# Patient Record
Sex: MALE | Race: WHITE | NOT HISPANIC OR LATINO | URBAN - METROPOLITAN AREA
[De-identification: names, ages, dates, MRNs, and addresses within clinical notes are randomized per-mention and may not be internally consistent; named-entity substitution may affect disease eponyms.]

---

## 2022-12-09 ENCOUNTER — EMERGENCY (EMERGENCY)
Facility: HOSPITAL | Age: 27
LOS: 1 days | Discharge: HOME | End: 2022-12-09
Attending: STUDENT IN AN ORGANIZED HEALTH CARE EDUCATION/TRAINING PROGRAM | Admitting: STUDENT IN AN ORGANIZED HEALTH CARE EDUCATION/TRAINING PROGRAM
Payer: SELF-PAY

## 2022-12-09 VITALS
RESPIRATION RATE: 18 BRPM | SYSTOLIC BLOOD PRESSURE: 113 MMHG | DIASTOLIC BLOOD PRESSURE: 82 MMHG | HEIGHT: 70 IN | HEART RATE: 89 BPM | WEIGHT: 145.06 LBS | OXYGEN SATURATION: 100 % | TEMPERATURE: 98 F

## 2022-12-09 DIAGNOSIS — J36 PERITONSILLAR ABSCESS: ICD-10-CM

## 2022-12-09 DIAGNOSIS — R09.81 NASAL CONGESTION: ICD-10-CM

## 2022-12-09 DIAGNOSIS — R07.0 PAIN IN THROAT: ICD-10-CM

## 2022-12-09 LAB
ALBUMIN SERPL ELPH-MCNC: 4.2 G/DL — SIGNIFICANT CHANGE UP (ref 3.3–5)
ALP SERPL-CCNC: 60 U/L — SIGNIFICANT CHANGE UP (ref 40–120)
ALT FLD-CCNC: 52 U/L — HIGH (ref 10–45)
ANION GAP SERPL CALC-SCNC: 9 MMOL/L — SIGNIFICANT CHANGE UP (ref 5–17)
AST SERPL-CCNC: 23 U/L — SIGNIFICANT CHANGE UP (ref 10–40)
BASOPHILS # BLD AUTO: 0.03 K/UL — SIGNIFICANT CHANGE UP (ref 0–0.2)
BASOPHILS NFR BLD AUTO: 0.3 % — SIGNIFICANT CHANGE UP (ref 0–2)
BILIRUB SERPL-MCNC: 0.4 MG/DL — SIGNIFICANT CHANGE UP (ref 0.2–1.2)
BUN SERPL-MCNC: 11 MG/DL — SIGNIFICANT CHANGE UP (ref 7–23)
CALCIUM SERPL-MCNC: 9.3 MG/DL — SIGNIFICANT CHANGE UP (ref 8.4–10.5)
CHLORIDE SERPL-SCNC: 101 MMOL/L — SIGNIFICANT CHANGE UP (ref 96–108)
CO2 SERPL-SCNC: 30 MMOL/L — SIGNIFICANT CHANGE UP (ref 22–31)
CREAT SERPL-MCNC: 0.84 MG/DL — SIGNIFICANT CHANGE UP (ref 0.5–1.3)
EGFR: 123 ML/MIN/1.73M2 — SIGNIFICANT CHANGE UP
EOSINOPHIL # BLD AUTO: 0.06 K/UL — SIGNIFICANT CHANGE UP (ref 0–0.5)
EOSINOPHIL NFR BLD AUTO: 0.7 % — SIGNIFICANT CHANGE UP (ref 0–6)
GLUCOSE SERPL-MCNC: 91 MG/DL — SIGNIFICANT CHANGE UP (ref 70–99)
HCT VFR BLD CALC: 39.2 % — SIGNIFICANT CHANGE UP (ref 39–50)
HETEROPH AB TITR SER AGGL: NEGATIVE — SIGNIFICANT CHANGE UP
HGB BLD-MCNC: 13.5 G/DL — SIGNIFICANT CHANGE UP (ref 13–17)
IMM GRANULOCYTES NFR BLD AUTO: 0.3 % — SIGNIFICANT CHANGE UP (ref 0–0.9)
LYMPHOCYTES # BLD AUTO: 2.14 K/UL — SIGNIFICANT CHANGE UP (ref 1–3.3)
LYMPHOCYTES # BLD AUTO: 23.9 % — SIGNIFICANT CHANGE UP (ref 13–44)
MCHC RBC-ENTMCNC: 31.7 PG — SIGNIFICANT CHANGE UP (ref 27–34)
MCHC RBC-ENTMCNC: 34.4 GM/DL — SIGNIFICANT CHANGE UP (ref 32–36)
MCV RBC AUTO: 92 FL — SIGNIFICANT CHANGE UP (ref 80–100)
MONOCYTES # BLD AUTO: 0.83 K/UL — SIGNIFICANT CHANGE UP (ref 0–0.9)
MONOCYTES NFR BLD AUTO: 9.3 % — SIGNIFICANT CHANGE UP (ref 2–14)
NEUTROPHILS # BLD AUTO: 5.87 K/UL — SIGNIFICANT CHANGE UP (ref 1.8–7.4)
NEUTROPHILS NFR BLD AUTO: 65.5 % — SIGNIFICANT CHANGE UP (ref 43–77)
NRBC # BLD: 0 /100 WBCS — SIGNIFICANT CHANGE UP (ref 0–0)
PLATELET # BLD AUTO: 288 K/UL — SIGNIFICANT CHANGE UP (ref 150–400)
POTASSIUM SERPL-MCNC: 3.5 MMOL/L — SIGNIFICANT CHANGE UP (ref 3.5–5.3)
POTASSIUM SERPL-SCNC: 3.5 MMOL/L — SIGNIFICANT CHANGE UP (ref 3.5–5.3)
PROT SERPL-MCNC: 7.1 G/DL — SIGNIFICANT CHANGE UP (ref 6–8.3)
RBC # BLD: 4.26 M/UL — SIGNIFICANT CHANGE UP (ref 4.2–5.8)
RBC # FLD: 11.5 % — SIGNIFICANT CHANGE UP (ref 10.3–14.5)
SODIUM SERPL-SCNC: 140 MMOL/L — SIGNIFICANT CHANGE UP (ref 135–145)
WBC # BLD: 8.96 K/UL — SIGNIFICANT CHANGE UP (ref 3.8–10.5)
WBC # FLD AUTO: 8.96 K/UL — SIGNIFICANT CHANGE UP (ref 3.8–10.5)

## 2022-12-09 PROCEDURE — 99285 EMERGENCY DEPT VISIT HI MDM: CPT

## 2022-12-09 PROCEDURE — 70491 CT SOFT TISSUE NECK W/DYE: CPT | Mod: 26,MG

## 2022-12-09 PROCEDURE — G1004: CPT

## 2022-12-09 RX ORDER — LIDOCAINE 4 G/100G
10 CREAM TOPICAL ONCE
Refills: 0 | Status: COMPLETED | OUTPATIENT
Start: 2022-12-09 | End: 2022-12-09

## 2022-12-09 RX ORDER — KETOROLAC TROMETHAMINE 30 MG/ML
15 SYRINGE (ML) INJECTION ONCE
Refills: 0 | Status: DISCONTINUED | OUTPATIENT
Start: 2022-12-09 | End: 2022-12-09

## 2022-12-09 RX ORDER — DEXAMETHASONE 0.5 MG/5ML
8 ELIXIR ORAL ONCE
Refills: 0 | Status: COMPLETED | OUTPATIENT
Start: 2022-12-09 | End: 2022-12-09

## 2022-12-09 RX ADMIN — Medication 15 MILLIGRAM(S): at 20:01

## 2022-12-09 RX ADMIN — Medication 100 MILLIGRAM(S): at 22:30

## 2022-12-09 RX ADMIN — Medication 8 MILLIGRAM(S): at 21:53

## 2022-12-09 RX ADMIN — LIDOCAINE 10 MILLILITER(S): 4 CREAM TOPICAL at 23:51

## 2022-12-09 RX ADMIN — Medication 15 MILLIGRAM(S): at 21:40

## 2022-12-09 NOTE — ED PROVIDER NOTE - OBJECTIVE STATEMENT
28 yo male in the ER with pain and swelling in his throat, concerned for possible abscess. Pt reports that a month ago he had similar symptoms and was seen at the urgent care. Pt states he had negative strep but because of his symptoms he was prescribed a course of abx that he had finished. Pt states 6 days ago he  noted right tonsil swelling and pain again. He went to the urgent care again. Tested negative for covid, flu and strep at this time again and prescribed Amoxicillin for his symptoms. pt is concerned that despite taking abx his throat pain became much worse. He has difficulty swallowing due to pain, reports entire right side jaw is painful, and swelling on the right side increased in size a lot. Pt reports subjective fever nd chills, some nasal congestion, hoarseness. 26 yo male in the ER with pain and swelling in his throat, concerned for possible abscess. Pt reports that a month ago he had similar symptoms and was seen at the urgent care. Pt states he had negative strep but because of his symptoms he was prescribed a course of abx that he had finished. Pt states 6 days ago he  noted right tonsil swelling and pain again. He went to the urgent care again. Tested negative for covid, flu and strep at this time again and prescribed Amoxicillin for his symptoms. pt is concerned that despite taking abx his throat pain became much worse. He has difficulty swallowing due to pain, reports entire right side jaw is painful, and swelling on the right side increased in size a lot. Pt reports subjective fever and chills, some nasal congestion, hoarseness.

## 2022-12-09 NOTE — ED PROVIDER NOTE - NSFOLLOWUPINSTRUCTIONS_ED_ALL_ED_FT
Please continue medications as prescribed and follow up with an ENT next week for re-evaluation,   Call [phone]      Peritonsillar Abscess    An open mouth showing the tonsils.   A peritonsillar abscess is a collection of pus in the back of the throat, behind the tonsils. It usually occurs when an infection of the throat or tonsils (tonsillitis) spreads into the tissues around the tonsils.      What are the causes?    The infection that leads to a peritonsillar abscess is usually caused by streptococcal bacteria.      What increases the risk?    You are more likely to develop this condition if:  •You have recently been diagnosed with an infection in your mouth or throat.      •You smoke.      •You have gum disease or gingivitis (periodontal disease).        What are the signs or symptoms?    Early symptoms of this condition include:  •Fever and chills.      •A sore throat, often with pain on just one side.      •Swollen, tender glands (lymph nodes) in the neck.      •Headache.      As the infection gets worse, symptoms may include:  •Difficulty swallowing.      •Drooling because of difficulty swallowing saliva.      •Difficulty opening your mouth.      •Bad breath.      •Changes in how the voice sounds.        How is this diagnosed?    This condition may be diagnosed based on:  •Your symptoms and medical history.      •A physical exam.      •Imaging tests, such as ultrasound or CT scan.      •Testing a pus sample from the abscess. Your health care provider may collect a pus sample by swabbing the back of your throat or by removing some pus with a syringe and needle (needle aspiration).        How is this treated?    Treatment usually involves draining the pus from the abscess. This may be done through needle aspiration or by making an incision in the abscess and draining the fluid. You will also likely need to take antibiotic medicine.      Follow these instructions at home:    Medicines     •Take over-the-counter and prescription medicines only as told by your health care provider.      •If you were prescribed an antibiotic, take it as told by your health care provider. Do not stop taking the antibiotic even if you start to feel better.        Eating and drinking   A diet of soft foods, including apple sauce, yogurt, and a smoothie.    •Drink enough fluid to keep your urine pale yellow.      •While your throat is sore, try only drinking liquids or eating only soft-textured foods such as yogurt and ice cream.      Activity     •Rest as told by your health care provider.      •Return to your normal activities as told by your health care provider. Ask your health care provider what activities are safe for you.      General Instructions   •If your abscess was drained, gargle with a mixture of salt and water 3–4 times a day or as needed.  •To make salt water, completely dissolve ½–1 tsp (3–6 g) of salt in 1 cup (237 mL) of warm water.      •Do not swallow this mixture.        • Do not use any products that contain nicotine or tobacco. These products include cigarettes, chewing tobacco, and vaping devices, such as e-cigarettes. If you need help quitting, ask your health care provider.      •Keep all follow-up visits. This is important.        Contact a health care provider if:    •You have more pain, swelling, redness, or pus in your throat.      •You have a headache.      •You have a lack of energy (lethargy) or feel generally sick.      •You have a fever or chills.      •You have trouble swallowing or eating.    •You have signs of dehydration, such as:  •Light-headedness or dizziness when standing.      •Urinating less than usual.      •A fast heart rate.      •Dry mouth.          Get help right away if:    •You are unable to swallow.      •You have trouble breathing, or it is easier for you to breathe when you lean forward.      •You cough up blood or vomit blood after treatment.      •You have severe throat pain that does not get better with medicine.      These symptoms may represent a serious problem that is an emergency. Do not wait to see if the symptoms will go away. Get medical help right away. Call your local emergency services (911 in the [place] Do not drive yourself to the hospital.       Summary    •A peritonsillar abscess is a collection of pus in the back of the throat. It usually occurs when an infection of the throat or tonsils spreads to surrounding tissues.      •Symptoms include a sore throat, difficulty swallowing, fever, chills, and occasional drooling.      •This condition is treated by draining the abscess and taking antibiotic medicine.      •Call your health care provider if you have trouble swallowing or eating after treatment.      •Get help right away if you vomit blood or cough up blood after treatment.      This information is not intended to replace advice given to you by your health care provider. Make sure you discuss any questions you have with your health care provider.

## 2022-12-09 NOTE — ED PROVIDER NOTE - ATTENDING APP SHARED VISIT CONTRIBUTION OF CARE
26 yo male in the Er with pain and swelling in his throat. Afebrile, non-toxic appearing. exam findings suspicious for possible PTA. plan :  -labs, CT soft tissue neck, IVF, Toradol and Decadron.     ER course.   Ct findings consistent with 2 cm right PTA. ENT called for consult and bedside I&D done by ENT. pt recommended to be d/c home with oral; Clindamycin and to f/u with ENT next week. D/c home stable.

## 2022-12-09 NOTE — ED ADULT TRIAGE NOTE - OTHER COMPLAINTS
patient c/o ongoing throat pain and swelling with difficulty swallowing, denies improvement after abx-- +swelling noted,  +voice change-- tolerating oral secretions

## 2022-12-09 NOTE — ED PROVIDER NOTE - PATIENT PORTAL LINK FT
You can access the FollowMyHealth Patient Portal offered by [facility] by registering at the following website: [email or web address]. By joining [employer]’s FollowMyHealth portal, you will also be able to view your health information using other applications (apps) compatible with our system.

## 2022-12-09 NOTE — ED PROVIDER NOTE - NS ED ATTENDING STATEMENT MOD
This was a shared visit with the PRACHI. I reviewed and verified the documentation and independently performed the documented:

## 2022-12-09 NOTE — ED ADULT NURSE REASSESSMENT NOTE - NS ED NURSE REASSESS COMMENT FT1
lidocaine out of stock in the ed notified pharmacist and clindamycin 900mg iv not stocked in ed notified pharmacist and sent tube to pharmacy at this time for medications to be sent to ed for administration.

## 2022-12-09 NOTE — ED ADULT NURSE NOTE - CAS DISCH BELONGINGS RETURNED
Implemented All Universal Safety Interventions:  [place] to call system. Call bell, personal items and telephone within reach. Instruct patient to call for assistance. Room bathroom lighting operational. Non-slip footwear when patient is off stretcher. Physically safe environment: no spills, clutter or unnecessary equipment. Stretcher in lowest position, wheels locked, appropriate side rails in place. Not applicable

## 2022-12-09 NOTE — ED ADULT TRIAGE NOTE - HEART RATE (BEATS/MIN)
Chief Complaint   Patient presents with     Labs Only     labs drawn via port by RN in lab     There were no vitals taken for this visit.    Port accessed by RN in lab. Labs collected and sent. Pt tolerated well. Line flushed with Normal Saline & Heparin.     Priscilla Gonzalez RN     89

## 2022-12-09 NOTE — CONSULT NOTE ADULT - SUBJECTIVE AND OBJECTIVE BOX
HPI: 26 yo male in the ER with pain and swelling in his throat, concerned for possible abscess. Pt reports that a month ago he had similar symptoms and was seen at the urgent care. Pt states he had negative strep but because of his symptoms he was prescribed a course of abx that he had finished. Pt states 6 days ago he  noted right tonsil swelling and pain again. He went to the urgent care again. Tested negative for covid, flu and strep at this time again and prescribed Amoxicillin for his symptoms. pt is concerned that despite taking abx his throat pain became much worse. He has difficulty swallowing due to pain, reports entire right side jaw is painful, and swelling on the right side increased in size a lot. Pt reports subjective fever nd chills, some nasal congestion, hoarseness.    Allergies    No Known Allergies    Intolerances        PAST MEDICAL & SURGICAL HISTORY:      MEDICATIONS:  Antiinfectives:     Hematologic/Anticoagulation:    Pain medications/Neuro:    IV fluids:    Endocrine Medications:     All other standing medications:   lidocaine 2% Viscous 10 milliLiter(s) Swish and Spit Once    All other PRN medications:      SOCIAL HISTORY:  Tobacco History:  ETOH Use:   Drug Use:     FAMILY HISTORY:      REVIEW OF SYSTEMS:     LABS:  CBC-                        13.5   8.96  )-----------( 288      ( 09 Dec 2022 20:01 )             39.2         12-09    140  |  101  |  11  ----------------------------<  91  3.5   |  30  |  0.84    Ca    9.3      09 Dec 2022 20:01    TPro  7.1  /  Alb  4.2  /  TBili  0.4  /  DBili  x   /  AST  23  /  ALT  52<H>  /  AlkPhos  60  12-09    Coagulation Studies-    Endocrine Panel-  --  --  9.3 mg/dL      Vital Signs Last 24 Hrs  T(C): 36.9 (09 Dec 2022 22:39), Max: 36.9 (09 Dec 2022 19:21)  T(F): 98.5 (09 Dec 2022 22:39), Max: 98.5 (09 Dec 2022 19:21)  HR: 58 (09 Dec 2022 22:39) (58 - 89)  BP: 107/66 (09 Dec 2022 22:39) (107/66 - 113/82)  BP(mean): --  RR: 16 (09 Dec 2022 22:39) (16 - 18)  SpO2: 99% (09 Dec 2022 22:39) (99% - 100%)    Parameters below as of 09 Dec 2022 22:39  Patient On (Oxygen Delivery Method): room air      PHYSICAL EXAM:  ENT EXAM-   Constitutional: Well-developed, well-nourished.  No hoarseness.     Head:  normocephalic, atraumatic.   Ears:  Ear canals both clear.  Tympanic membranes both intact; no effusion or retraction.  Nose:  Septum **intact / midline / deviated**.  Inferior turbinates normal bilateral  OC/OP:  Floor of mouth, buccal mucosa, lips, hard palate, soft palate, uvula, posterior pharyngeal wall normal.  Mucosa moist.  Neck:  Trachea midline.  Thyroid, parotid and submandibular glands normal.  Lymph:  No cervical adenopathy.  Facial Plastics:   MULTISYSTEM EXAM-  Neuro/Psych:  A&O x 3.  Mood stable.  Affect bright.  Cranial nerves: 2-12 grossly intact bilaterally.  Eyes:  EOMI, no nystagmus.  Pulm:  No dyspnea, non-labored breathing  Cardiovascular: Carotid pulses 2+ bilaterally.  No periphreal edema.  Skin:  No rash or lesions on exposed skin of head/neck    Procedure: PTA  Using a headlight for visualization, the patient was sprayed with hurricaine spray and then numbed with ***cc of 1% lidocaine w/epi. An 18 gauge needle was then used to aspirate purulent fluid from the *** peritonsillar region. Cultures were sent. Using an 11 blade scalpel, an curvilinear incision was then made in the area of aspiration from lateral to medial. The incision was spread open with a clamp and hemostasis was obtained.    RADIOLOGY & ADDITIONAL STUDIES:    < from: CT Neck Soft Tissue w/ IV Cont (12.09.22 @ 20:50) >  ACC: [id number] EXAM:  CT NECK SOFT TISSUE IC                          PROCEDURE DATE:  12/09/2022          INTERPRETATION:  Technique: A thin section axial acquisition was   performed from the skull base to the thoracic inlet.  The data was   reformatted in the sagittal, coronal and axial planes.    Contrast: 90 cc of Isovue-370 were administered intravenously.    Clinical Information: Right side back pain, dysphagia    Prior Studies: None    Findings:    *  Aerodigestive Tract: There is effacement of the aerodigestive tract at   the level of the palatine tonsils secondary to a 1.8 x 1.6 cm right   peritonsillar abscess. Patent upper airway. No radiopaque foreign body.   No retropharyngeal edema. No gas.    *  Lymph Nodes: No abnormal or enlarged lymph nodes.    *  Salivary Glands: Normal.    *  Thyroid Gland: Normal.    *  Orbits: Normal.    *  Brain: Limited visualized portions are within normal limits.    *  Skull Base: Intact.    *  Paranasal Sinuses: Clear.    *  Mastoids: Clear.    *  Cervical Spine: Within normal limits.    *  Lung Apices: Clear.      IMPRESSION:  2 cm right peritonsillar abscess.    --- End of Report ---          JOHN CORDOBA MD; Resident Radiologist  This document has been electronically signed.  DEBORAH SUE MD; Attending Radiologist  This document has been electronically signed. Dec  9 2022 10:30PM    < end of copied text >        A/P:  27y Male      Thank you for the consult, please page ENT at [phone] with any questions/concerns.  ----------------------------------------------------    Nessa Virgen MD  Department of Otolaryngology - Head and Neck Surgery  [facility]   HPI: 28 yo male in the ER with pain and swelling in his throat, concerned for possible abscess. Pt reports that a month ago he had similar symptoms and was seen at the urgent care. Pt states he had negative strep but because of his symptoms he was prescribed a course of abx that he had finished. Pt states 6 days ago he  noted right tonsil swelling and pain again. He went to the urgent care again. Tested negative for covid, flu and strep at this time again and prescribed Amoxicillin for his symptoms. pt is concerned that despite taking abx his throat pain became much worse. He has difficulty swallowing due to pain, reports entire right side jaw is painful, and swelling on the right side increased in size a lot. Pt reports subjective fever nd chills, some nasal congestion, hoarseness. CT in ED shows 2 cm R PTA.    Allergies    No Known Allergies    Intolerances        PAST MEDICAL & SURGICAL HISTORY:      MEDICATIONS:  Antiinfectives:     Hematologic/Anticoagulation:    Pain medications/Neuro:    IV fluids:    Endocrine Medications:     All other standing medications:   lidocaine 2% Viscous 10 milliLiter(s) Swish and Spit Once    All other PRN medications:      SOCIAL HISTORY:  Tobacco History:  ETOH Use:   Drug Use:     FAMILY HISTORY:      REVIEW OF SYSTEMS:     LABS:  CBC-                        13.5   8.96  )-----------( 288      ( 09 Dec 2022 20:01 )             39.2         12-09    140  |  101  |  11  ----------------------------<  91  3.5   |  30  |  0.84    Ca    9.3      09 Dec 2022 20:01    TPro  7.1  /  Alb  4.2  /  TBili  0.4  /  DBili  x   /  AST  23  /  ALT  52<H>  /  AlkPhos  60  12-09    Coagulation Studies-    Endocrine Panel-  --  --  9.3 mg/dL      Vital Signs Last 24 Hrs  T(C): 36.9 (09 Dec 2022 22:39), Max: 36.9 (09 Dec 2022 19:21)  T(F): 98.5 (09 Dec 2022 22:39), Max: 98.5 (09 Dec 2022 19:21)  HR: 58 (09 Dec 2022 22:39) (58 - 89)  BP: 107/66 (09 Dec 2022 22:39) (107/66 - 113/82)  BP(mean): --  RR: 16 (09 Dec 2022 22:39) (16 - 18)  SpO2: 99% (09 Dec 2022 22:39) (99% - 100%)    Parameters below as of 09 Dec 2022 22:39  Patient On (Oxygen Delivery Method): room air      PHYSICAL EXAM:  ENT EXAM-   Constitutional: Well-developed, well-nourished.  No hoarseness.     Head:  normocephalic, atraumatic.   OC/OP:  Floor of mouth, buccal mucosa, lips normal. B/l tonsillar enlargement with R peritonsillar fullness and uvular deviation  Neck:  Trachea midline.  Thyroid, parotid and submandibular glands normal.  MULTISYSTEM EXAM-  Neuro/Psych:  A&O x 3.  Mood stable.  Affect bright.  Eyes:  EOMI, no nystagmus.  Skin:  No rash or lesions on exposed skin of head/neck    Procedure: PTA  Using a headlight for visualization, the patient was numbed with viscous lidocaine and 2cc of 2% lidocaine . An 18 gauge needle was then used to aspirate purulent fluid from the R peritonsillar region. Cultures were sent. Using an 11 blade scalpel, an curvilinear incision was then made in the area of aspiration from lateral to medial. The incision was spread open with a clamp and hemostasis was obtained.    RADIOLOGY & ADDITIONAL STUDIES:    < from: CT Neck Soft Tissue w/ IV Cont (12.09.22 @ 20:50) >  ACC: [id number] EXAM:  CT NECK SOFT TISSUE IC                          PROCEDURE DATE:  12/09/2022          INTERPRETATION:  Technique: A thin section axial acquisition was   performed from the skull base to the thoracic inlet.  The data was   reformatted in the sagittal, coronal and axial planes.    Contrast: 90 cc of Isovue-370 were administered intravenously.    Clinical Information: Right side back pain, dysphagia    Prior Studies: None    Findings:    *  Aerodigestive Tract: There is effacement of the aerodigestive tract at   the level of the palatine tonsils secondary to a 1.8 x 1.6 cm right   peritonsillar abscess. Patent upper airway. No radiopaque foreign body.   No retropharyngeal edema. No gas.    *  Lymph Nodes: No abnormal or enlarged lymph nodes.    *  Salivary Glands: Normal.    *  Thyroid Gland: Normal.    *  Orbits: Normal.    *  Brain: Limited visualized portions are within normal limits.    *  Skull Base: Intact.    *  Paranasal Sinuses: Clear.    *  Mastoids: Clear.    *  Cervical Spine: Within normal limits.    *  Lung Apices: Clear.      IMPRESSION:  2 cm right peritonsillar abscess.    --- End of Report ---          JOHN CORDOBA MD; Resident Radiologist  This document has been electronically signed.  DEBORAH SUE MD; Attending Radiologist  This document has been electronically signed. Dec  9 2022 10:30PM    < end of copied text >        A/P:  27y Male s/p I&D of R PTA  -Clindamycin x 10 days  -f/u with ENT [phone]  -Counseled to return to ED if worsening pain/symptoms or no improvement in next 24-48 hours  -d/w attending  -Call with questions        Thank you for the consult, please page ENT at [phone] with any questions/concerns.  ----------------------------------------------------    Nessa Virgen MD  Department of Otolaryngology - Head and Neck Surgery  [facility]

## 2022-12-09 NOTE — ED PROVIDER NOTE - CLINICAL SUMMARY MEDICAL DECISION MAKING FREE TEXT BOX
28 yo male in the Er with pain and swelling in his throat. Afebrile, non-toxic appearing. exam findings suspicious for possible PTA. plan :  -labs, CT soft tissue neck, IVF, Toradol and Decadron.     ER course.   Ct findings consistent with 2 cm right PTA. ENT called for consult and bedside I&D done by ENT. pt recommended to be d/c home with oral; Clindamycin and to f/u with ENT next week. D/c home stable.

## 2022-12-09 NOTE — ED PROVIDER NOTE - ENMT, MLM
Airway patent, Nasal mucosa clear. posterior pharyngeal erythema+, enlarged tonsils, R>L,  Throat has no vesicles, no oropharyngeal exudates and uvula is midline.

## 2022-12-10 VITALS
RESPIRATION RATE: 16 BRPM | OXYGEN SATURATION: 98 % | TEMPERATURE: 98 F | HEART RATE: 64 BPM | DIASTOLIC BLOOD PRESSURE: 72 MMHG | SYSTOLIC BLOOD PRESSURE: 110 MMHG

## 2022-12-10 LAB
GRAM STN FLD: SIGNIFICANT CHANGE UP
SPECIMEN SOURCE: SIGNIFICANT CHANGE UP

## 2022-12-10 PROCEDURE — G1004: CPT

## 2022-12-10 PROCEDURE — 80053 COMPREHEN METABOLIC PANEL: CPT

## 2022-12-10 PROCEDURE — 36415 COLL VENOUS BLD VENIPUNCTURE: CPT

## 2022-12-10 PROCEDURE — 87070 CULTURE OTHR SPECIMN AEROBIC: CPT

## 2022-12-10 PROCEDURE — 99284 EMERGENCY DEPT VISIT MOD MDM: CPT | Mod: 25

## 2022-12-10 PROCEDURE — 96375 TX/PRO/DX INJ NEW DRUG ADDON: CPT | Mod: XU

## 2022-12-10 PROCEDURE — 70491 CT SOFT TISSUE NECK W/DYE: CPT | Mod: MG

## 2022-12-10 PROCEDURE — 42700 I&D ABSCESS PERITONSILLAR: CPT

## 2022-12-10 PROCEDURE — 96374 THER/PROPH/DIAG INJ IV PUSH: CPT | Mod: XU

## 2022-12-10 PROCEDURE — 85025 COMPLETE CBC W/AUTO DIFF WBC: CPT

## 2022-12-10 PROCEDURE — 87205 SMEAR GRAM STAIN: CPT

## 2022-12-10 PROCEDURE — 86308 HETEROPHILE ANTIBODY SCREEN: CPT

## 2022-12-10 PROCEDURE — 87075 CULTR BACTERIA EXCEPT BLOOD: CPT

## 2022-12-12 LAB
CULTURE RESULTS: SIGNIFICANT CHANGE UP
SPECIMEN SOURCE: SIGNIFICANT CHANGE UP

## 2023-02-21 ENCOUNTER — EMERGENCY (EMERGENCY)
Facility: HOSPITAL | Age: 28
LOS: 1 days | Discharge: HOME | End: 2023-02-21
Attending: STUDENT IN AN ORGANIZED HEALTH CARE EDUCATION/TRAINING PROGRAM | Admitting: STUDENT IN AN ORGANIZED HEALTH CARE EDUCATION/TRAINING PROGRAM
Payer: SELF-PAY

## 2023-02-21 VITALS
TEMPERATURE: 98 F | RESPIRATION RATE: 18 BRPM | OXYGEN SATURATION: 98 % | DIASTOLIC BLOOD PRESSURE: 72 MMHG | SYSTOLIC BLOOD PRESSURE: 107 MMHG

## 2023-02-21 VITALS
HEART RATE: 71 BPM | SYSTOLIC BLOOD PRESSURE: 133 MMHG | DIASTOLIC BLOOD PRESSURE: 84 MMHG | OXYGEN SATURATION: 97 % | TEMPERATURE: 99 F | RESPIRATION RATE: 18 BRPM

## 2023-02-21 DIAGNOSIS — F17.200 NICOTINE DEPENDENCE, UNSPECIFIED, UNCOMPLICATED: ICD-10-CM

## 2023-02-21 DIAGNOSIS — R07.0 PAIN IN THROAT: ICD-10-CM

## 2023-02-21 LAB
ANION GAP SERPL CALC-SCNC: 8 MMOL/L — SIGNIFICANT CHANGE UP (ref 5–17)
BASOPHILS # BLD AUTO: 0.02 K/UL — SIGNIFICANT CHANGE UP (ref 0–0.2)
BASOPHILS NFR BLD AUTO: 0.2 % — SIGNIFICANT CHANGE UP (ref 0–2)
BUN SERPL-MCNC: 13 MG/DL — SIGNIFICANT CHANGE UP (ref 7–23)
CALCIUM SERPL-MCNC: 9.2 MG/DL — SIGNIFICANT CHANGE UP (ref 8.4–10.5)
CHLORIDE SERPL-SCNC: 103 MMOL/L — SIGNIFICANT CHANGE UP (ref 96–108)
CO2 SERPL-SCNC: 30 MMOL/L — SIGNIFICANT CHANGE UP (ref 22–31)
CREAT SERPL-MCNC: 0.8 MG/DL — SIGNIFICANT CHANGE UP (ref 0.5–1.3)
EGFR: 125 ML/MIN/1.73M2 — SIGNIFICANT CHANGE UP
EOSINOPHIL # BLD AUTO: 0.08 K/UL — SIGNIFICANT CHANGE UP (ref 0–0.5)
EOSINOPHIL NFR BLD AUTO: 0.9 % — SIGNIFICANT CHANGE UP (ref 0–6)
GLUCOSE SERPL-MCNC: 110 MG/DL — HIGH (ref 70–99)
HCT VFR BLD CALC: 41.7 % — SIGNIFICANT CHANGE UP (ref 39–50)
HGB BLD-MCNC: 14.6 G/DL — SIGNIFICANT CHANGE UP (ref 13–17)
IMM GRANULOCYTES NFR BLD AUTO: 0.3 % — SIGNIFICANT CHANGE UP (ref 0–0.9)
LYMPHOCYTES # BLD AUTO: 1.5 K/UL — SIGNIFICANT CHANGE UP (ref 1–3.3)
LYMPHOCYTES # BLD AUTO: 16.4 % — SIGNIFICANT CHANGE UP (ref 13–44)
MCHC RBC-ENTMCNC: 31.8 PG — SIGNIFICANT CHANGE UP (ref 27–34)
MCHC RBC-ENTMCNC: 35 GM/DL — SIGNIFICANT CHANGE UP (ref 32–36)
MCV RBC AUTO: 90.8 FL — SIGNIFICANT CHANGE UP (ref 80–100)
MONOCYTES # BLD AUTO: 0.63 K/UL — SIGNIFICANT CHANGE UP (ref 0–0.9)
MONOCYTES NFR BLD AUTO: 6.9 % — SIGNIFICANT CHANGE UP (ref 2–14)
NEUTROPHILS # BLD AUTO: 6.86 K/UL — SIGNIFICANT CHANGE UP (ref 1.8–7.4)
NEUTROPHILS NFR BLD AUTO: 75.3 % — SIGNIFICANT CHANGE UP (ref 43–77)
NRBC # BLD: 0 /100 WBCS — SIGNIFICANT CHANGE UP (ref 0–0)
PLATELET # BLD AUTO: 282 K/UL — SIGNIFICANT CHANGE UP (ref 150–400)
POTASSIUM SERPL-MCNC: 3.7 MMOL/L — SIGNIFICANT CHANGE UP (ref 3.5–5.3)
POTASSIUM SERPL-SCNC: 3.7 MMOL/L — SIGNIFICANT CHANGE UP (ref 3.5–5.3)
RBC # BLD: 4.59 M/UL — SIGNIFICANT CHANGE UP (ref 4.2–5.8)
RBC # FLD: 11.8 % — SIGNIFICANT CHANGE UP (ref 10.3–14.5)
S PYO AG SPEC QL IA: NEGATIVE — SIGNIFICANT CHANGE UP
SODIUM SERPL-SCNC: 141 MMOL/L — SIGNIFICANT CHANGE UP (ref 135–145)
WBC # BLD: 9.12 K/UL — SIGNIFICANT CHANGE UP (ref 3.8–10.5)
WBC # FLD AUTO: 9.12 K/UL — SIGNIFICANT CHANGE UP (ref 3.8–10.5)

## 2023-02-21 PROCEDURE — 99285 EMERGENCY DEPT VISIT HI MDM: CPT

## 2023-02-21 PROCEDURE — 87880 STREP A ASSAY W/OPTIC: CPT

## 2023-02-21 PROCEDURE — 96361 HYDRATE IV INFUSION ADD-ON: CPT

## 2023-02-21 PROCEDURE — 87081 CULTURE SCREEN ONLY: CPT

## 2023-02-21 PROCEDURE — 85025 COMPLETE CBC W/AUTO DIFF WBC: CPT

## 2023-02-21 PROCEDURE — 36415 COLL VENOUS BLD VENIPUNCTURE: CPT

## 2023-02-21 PROCEDURE — 80048 BASIC METABOLIC PNL TOTAL CA: CPT

## 2023-02-21 PROCEDURE — 96375 TX/PRO/DX INJ NEW DRUG ADDON: CPT

## 2023-02-21 PROCEDURE — 99284 EMERGENCY DEPT VISIT MOD MDM: CPT | Mod: 25

## 2023-02-21 PROCEDURE — 96374 THER/PROPH/DIAG INJ IV PUSH: CPT

## 2023-02-21 RX ORDER — SODIUM CHLORIDE 9 MG/ML
1000 INJECTION, SOLUTION INTRAVENOUS ONCE
Refills: 0 | Status: COMPLETED | OUTPATIENT
Start: 2023-02-21 | End: 2023-02-21

## 2023-02-21 RX ORDER — DEXAMETHASONE 0.5 MG/5ML
10 ELIXIR ORAL ONCE
Refills: 0 | Status: DISCONTINUED | OUTPATIENT
Start: 2023-02-21 | End: 2023-02-21

## 2023-02-21 RX ORDER — AMPICILLIN SODIUM AND SULBACTAM SODIUM 250; 125 MG/ML; MG/ML
3 INJECTION, POWDER, FOR SUSPENSION INTRAMUSCULAR; INTRAVENOUS ONCE
Refills: 0 | Status: COMPLETED | OUTPATIENT
Start: 2023-02-21 | End: 2023-02-21

## 2023-02-21 RX ORDER — BENZOCAINE 10 %
1 GEL (GRAM) MUCOUS MEMBRANE ONCE
Refills: 0 | Status: COMPLETED | OUTPATIENT
Start: 2023-02-21 | End: 2023-02-21

## 2023-02-21 RX ORDER — LIDOCAINE HYDROCHLORIDE AND EPINEPHRINE 10; 10 MG/ML; UG/ML
5 INJECTION, SOLUTION INFILTRATION; PERINEURAL ONCE
Refills: 0 | Status: COMPLETED | OUTPATIENT
Start: 2023-02-21 | End: 2023-02-21

## 2023-02-21 RX ORDER — KETOROLAC TROMETHAMINE 30 MG/ML
15 SYRINGE (ML) INJECTION ONCE
Refills: 0 | Status: DISCONTINUED | OUTPATIENT
Start: 2023-02-21 | End: 2023-02-21

## 2023-02-21 RX ADMIN — SODIUM CHLORIDE 1000 MILLILITER(S): 9 INJECTION, SOLUTION INTRAVENOUS at 21:37

## 2023-02-21 RX ADMIN — Medication 1 SPRAY(S): at 22:41

## 2023-02-21 RX ADMIN — Medication 15 MILLIGRAM(S): at 22:42

## 2023-02-21 RX ADMIN — SODIUM CHLORIDE 1000 MILLILITER(S): 9 INJECTION, SOLUTION INTRAVENOUS at 22:42

## 2023-02-21 RX ADMIN — Medication 15 MILLIGRAM(S): at 21:36

## 2023-02-21 RX ADMIN — AMPICILLIN SODIUM AND SULBACTAM SODIUM 200 GRAM(S): 250; 125 INJECTION, POWDER, FOR SUSPENSION INTRAMUSCULAR; INTRAVENOUS at 22:46

## 2023-02-21 NOTE — ED PROVIDER NOTE - PATIENT PORTAL LINK FT
You can access the FollowMyHealth Patient Portal offered by [facility] by registering at the following website: [email or web address]. By joining [employer]’s FollowMyHealth portal, you will also be able to view your health information using other applications (apps) compatible with our system.

## 2023-02-21 NOTE — ED PROVIDER NOTE - NSFOLLOWUPINSTRUCTIONS_ED_ALL_ED_FT
Take clindamycin as prescribed for 10 days. Follow up with ENT for further management of your recurrent throat infections.    Come back to ED if you develop high fevers, drooling, trouble opening your mouth, difficulty swallowing, change in voice.    Please reach out to Hilda Mora ([facility] ED clinical referral coordinator) to assist you with your follow-up appointment.     Monday - Friday 11am-7pm  [phone]  [email or web address]

## 2023-02-21 NOTE — ED ADULT NURSE NOTE - PAIN RATING/NUMBER SCALE (0-10): ACTIVITY
change of breathing pattern/cough/gurgly voice/fever/pneumonia/throat clearing/upper respiratory infection 3 (mild pain)

## 2023-02-21 NOTE — ED ADULT TRIAGE NOTE - CCCP TRG CHIEF CMPLNT
PATIENT LAST HAD SLEEP STUDY ABOUT 15 YEARS AGO. I HAVE NOT BEEN ABLE TO LOCATE THE REPORT FOR THE SLEEP STUDY, NOR HAS THE PATIENT, SO ADVISED PATIENT THAT HE WILL NEED A NEW SLEEP STUDY DONE. HE WAS AGREEABLE TO THIS. ORDER HAS BEEN FAXED TO [facility] FOR SLEEP STUDY AND THEY WILL CONTACT PATIENT TO SET UP.       ----- Message from Aleksandra Richard MA sent at 5/14/2018 12:27 PM EDT -----  Contact: JÚNIOR WITH [facility] CALLED FOR SLEEP STUDY TO BE FAXED     FAX NUMBER FOR [facility] [phone]       [phone]      throat, pain

## 2023-02-21 NOTE — ED PROVIDER NOTE - OBJECTIVE STATEMENT
27yM w/ hx R PTA 2 months ago (prior to that started amox which he found unhelpful, developed PTA and was drained by ENT and took clinda)  comes in with pain to the R side of his throat x 2 days. Says it feels the same to his prior PTA, so started taking the amox he had left over (took 2 doses so far). No trismus, drooling, fever, voice changes. Is concerned his PTA is back.  Pt also says he is "a chain smoker" but is quitting bc he feels the smoking is contributing to his throat and tonsil infections.

## 2023-02-21 NOTE — ED ADULT NURSE NOTE - OBJECTIVE STATEMENT
Pt is 27 y.o male client walked in complaining of R PTA and sore throat started days ago. Pt was also seen in [facility] ED 2 months ago for the same symptoms diagnosed with right PTA dced with abx now complaining of similar sx. Pt admits pain only with swallowing. Denies fever, chills, headache, ear pain, hoarseness of voice not noted. Pt noted to have R swollen tonsil.  Pt took amoxicillin was prescribed the first time he had PTA at urgent care with no relief. Assessment ongoing. Will cont to monitor.

## 2023-02-21 NOTE — ED PROVIDER NOTE - PHYSICAL EXAMINATION
CONST: nontoxic NAD speaking in full sentences  HEAD: atraumatic  EYES: conjunctivae clear, PERRL, EOMI  ENT: mmm, uvula midline but R tonsillar area prominent and erythematous, appears full  NECK: supple/FROM, nttp  CARD: rrr   CHEST: ctab no r/r/w, no stridor/retractions/tripoding  ABD: soft, nd, nttp, no rebound/guarding  EXT: FROM, symmetric distal pulses intact  SKIN: warm, dry, no rash, no pedal edema/ttp/rash, cap refill <2sec  NEURO: awake alert answering questions following commands moving all extremities

## 2024-03-24 ENCOUNTER — NON-APPOINTMENT (OUTPATIENT)
Age: 29
End: 2024-03-24

## 2024-03-26 ENCOUNTER — NON-APPOINTMENT (OUTPATIENT)
Age: 29
End: 2024-03-26

## 2024-04-20 ENCOUNTER — TRANSCRIPTION ENCOUNTER (OUTPATIENT)
Age: 29
End: 2024-04-20

## 2024-04-20 ENCOUNTER — APPOINTMENT (OUTPATIENT)
Dept: OTOLARYNGOLOGY | Facility: CLINIC | Age: 29
End: 2024-04-20
Payer: COMMERCIAL

## 2024-04-20 VITALS
WEIGHT: 160 LBS | BODY MASS INDEX: 22.9 KG/M2 | HEIGHT: 70 IN | OXYGEN SATURATION: 97 % | TEMPERATURE: 97.7 F | DIASTOLIC BLOOD PRESSURE: 79 MMHG | SYSTOLIC BLOOD PRESSURE: 131 MMHG | HEART RATE: 85 BPM

## 2024-04-20 DIAGNOSIS — Z78.9 OTHER SPECIFIED HEALTH STATUS: ICD-10-CM

## 2024-04-20 DIAGNOSIS — F17.200 NICOTINE DEPENDENCE, UNSPECIFIED, UNCOMPLICATED: ICD-10-CM

## 2024-04-20 PROBLEM — Z00.00 ENCOUNTER FOR PREVENTIVE HEALTH EXAMINATION: Status: ACTIVE | Noted: 2024-04-20

## 2024-04-20 PROCEDURE — 31575 DIAGNOSTIC LARYNGOSCOPY: CPT

## 2024-04-20 PROCEDURE — 99204 OFFICE O/P NEW MOD 45 MIN: CPT | Mod: 25

## 2024-04-20 NOTE — CONSULT LETTER
[Dear  ___] : Dear  [unfilled], [Consult Letter:] : I had the pleasure of evaluating your patient, [unfilled]. [Please see my note below.] : Please see my note below. [Consult Closing:] : Thank you very much for allowing me to participate in the care of this patient.  If you have any questions, please do not hesitate to contact me. [FreeTextEntry3] : MAYA Marie Jr, MD, FAAOHNS Otolaryngologist [facility]

## 2024-04-20 NOTE — PROCEDURE
[de-identified] : Indication: requirement for exam not possible via anterior examination; recurrent pharyngitis After verbal consent and the administration of an aerosolized oxymetazoline/lidocaine mix, examination was performed with a flexible endoscope placed through the nose which was attached to a video monitoring system. Findings: Nasopharynx: unremarkable Soft palate, lateral and posterior pharyngeal walls: unremarkable Base of tongue & lingual tonsil: minimal retrodisplacement Vallecula: unremarkable Epiglottis: unremarkable Piriform sinuses and pharyngoesophageal junction: unremarkable Arytenoids and AE folds: mild interarytenoid edema Ventricle and false vocal folds: unremarkable True vocal folds: Smooth free edge; surface without ectasias or edema; normal movement bilaterally with good apposition in phonation Visible subglottis: unremarkable Narrow-band imaging: not used Other findings: none

## 2024-04-20 NOTE — PHYSICAL EXAM
[Laryngoscopy Performed] : laryngoscopy was performed, see procedure section for findings [de-identified] : 2+ [Normal] : no masses and lesions seen, face is symmetric

## 2024-04-20 NOTE — ASSESSMENT
[FreeTextEntry1] : We discussed the R&B of tonsillectomy and scheduled this; RTC preop  Reviewed reflux precautions and provided the patient with the corresponding educational handout.

## 2024-04-20 NOTE — HISTORY OF PRESENT ILLNESS
[de-identified] : Four to five episodes of tonsillitis per year and he's had two PTAs in the last year. He wants them removed.  He also has frequent tonsilliths.  Infrequent reflux

## 2024-05-02 ENCOUNTER — APPOINTMENT (OUTPATIENT)
Dept: INTERNAL MEDICINE | Facility: CLINIC | Age: 29
End: 2024-05-02
Payer: COMMERCIAL

## 2024-05-02 ENCOUNTER — NON-APPOINTMENT (OUTPATIENT)
Age: 29
End: 2024-05-02

## 2024-05-02 ENCOUNTER — APPOINTMENT (OUTPATIENT)
Dept: OTOLARYNGOLOGY | Facility: CLINIC | Age: 29
End: 2024-05-02
Payer: COMMERCIAL

## 2024-05-02 VITALS
HEIGHT: 70 IN | OXYGEN SATURATION: 97 % | DIASTOLIC BLOOD PRESSURE: 77 MMHG | HEART RATE: 81 BPM | BODY MASS INDEX: 22.9 KG/M2 | TEMPERATURE: 97.7 F | SYSTOLIC BLOOD PRESSURE: 130 MMHG | WEIGHT: 160 LBS | RESPIRATION RATE: 16 BRPM

## 2024-05-02 VITALS
OXYGEN SATURATION: 97 % | HEIGHT: 70 IN | DIASTOLIC BLOOD PRESSURE: 79 MMHG | SYSTOLIC BLOOD PRESSURE: 119 MMHG | TEMPERATURE: 97 F | WEIGHT: 160 LBS | BODY MASS INDEX: 22.9 KG/M2 | HEART RATE: 81 BPM

## 2024-05-02 DIAGNOSIS — Z01.818 ENCOUNTER FOR OTHER PREPROCEDURAL EXAMINATION: ICD-10-CM

## 2024-05-02 PROCEDURE — 99214 OFFICE O/P EST MOD 30 MIN: CPT | Mod: 25

## 2024-05-02 PROCEDURE — G0403: CPT

## 2024-05-02 PROCEDURE — 99215 OFFICE O/P EST HI 40 MIN: CPT

## 2024-05-02 PROCEDURE — 36415 COLL VENOUS BLD VENIPUNCTURE: CPT

## 2024-05-02 RX ORDER — OXYCODONE AND ACETAMINOPHEN 5; 325 MG/1; MG/1
5-325 TABLET ORAL
Qty: 30 | Refills: 0 | Status: DISCONTINUED | COMMUNITY
Start: 2024-05-02 | End: 2024-05-02

## 2024-05-02 RX ORDER — AMOXICILLIN 400 MG/5ML
400 FOR SUSPENSION ORAL
Qty: 2 | Refills: 0 | Status: DISCONTINUED | COMMUNITY
Start: 2024-05-02 | End: 2024-05-02

## 2024-05-02 RX ORDER — AMOXICILLIN 400 MG/5ML
400 FOR SUSPENSION ORAL
Qty: 2 | Refills: 0 | Status: ACTIVE | COMMUNITY
Start: 2024-05-02 | End: 1900-01-01

## 2024-05-02 RX ORDER — OXYCODONE AND ACETAMINOPHEN 5; 325 MG/1; MG/1
5-325 TABLET ORAL
Qty: 30 | Refills: 0 | Status: ACTIVE | COMMUNITY
Start: 2024-05-02 | End: 1900-01-01

## 2024-05-02 NOTE — ASSESSMENT
[FreeTextEntry1] : Preop done for tonsillectomy. The risks and benefits were fully reviewed including but not limited to: postoperative hemorrhage; velopharyngeal insufficiency; swallowing or speaking problems; asymmetric appearing palate. The patient would like to proceed. An educational perioperative care handout was given.  n/a

## 2024-05-02 NOTE — HISTORY OF PRESENT ILLNESS
[de-identified] : Four to five episodes of tonsillitis per year and he's had two PTAs in the last year. Now preop to have them removed.  He also has frequent tonsilliths.  Infrequent reflux

## 2024-05-03 LAB
ALBUMIN SERPL ELPH-MCNC: 4.7 G/DL
ALP BLD-CCNC: 81 U/L
ALT SERPL-CCNC: 69 U/L
ANION GAP SERPL CALC-SCNC: 13 MMOL/L
APTT BLD: 28.7 SEC
AST SERPL-CCNC: 44 U/L
BILIRUB SERPL-MCNC: 0.4 MG/DL
BUN SERPL-MCNC: 15 MG/DL
CALCIUM SERPL-MCNC: 9.6 MG/DL
CHLORIDE SERPL-SCNC: 106 MMOL/L
CO2 SERPL-SCNC: 24 MMOL/L
CREAT SERPL-MCNC: 1 MG/DL
EGFR: 106 ML/MIN/1.73M2
GLUCOSE SERPL-MCNC: 106 MG/DL
HCT VFR BLD CALC: 45.8 %
HGB BLD-MCNC: 15.1 G/DL
INR PPP: 0.96 RATIO
MCHC RBC-ENTMCNC: 32.3 PG
MCHC RBC-ENTMCNC: 33 GM/DL
MCV RBC AUTO: 97.9 FL
PLATELET # BLD AUTO: 264 K/UL
POTASSIUM SERPL-SCNC: 4.6 MMOL/L
PROT SERPL-MCNC: 7.2 G/DL
PT BLD: 10.9 SEC
RBC # BLD: 4.68 M/UL
RBC # FLD: 13.3 %
SODIUM SERPL-SCNC: 142 MMOL/L
WBC # FLD AUTO: 5.53 K/UL

## 2024-05-03 NOTE — HISTORY OF PRESENT ILLNESS
[(Patient denies any chest pain, claudication, dyspnea on exertion, orthopnea, palpitations or syncope)] : Patient denies any chest pain, claudication, dyspnea on exertion, orthopnea, palpitations or syncope [Good (7-10 METs)] : Good (7-10 METs) [No Pertinent Cardiac History] : no history of aortic stenosis, atrial fibrillation, coronary artery disease, recent myocardial infarction, or implantable device/pacemaker [No Pertinent Pulmonary History] : no history of asthma, COPD, sleep apnea, or smoking [No Adverse Anesthesia Reaction] : no adverse anesthesia reaction in self or family member [FreeTextEntry1] : tonsillectomy  [FreeTextEntry2] : 05/08/2024 [FreeTextEntry4] : 28 yrs old M with pmx of seasonal allergies, MSM comes in for pre-op eval for tonsillectomy.  No new complains.  Denies any chest pain, cough, fevers, abd pain, vomiting, diarrhea, rash, joint pains, sob, palpitations. Good functional capacity: can walk 3 flights of stairs without any problems.  No problems with anesthesia in the past. Denies any blood thinners or herbal meds use.

## 2024-05-03 NOTE — ASSESSMENT
[Patient Optimized for Surgery] : Patient optimized for surgery [No Further Testing Recommended] : no further testing recommended [FreeTextEntry4] : 28 yrs old M with pmx of seasonal allergies, MSM comes in for pre-op eval for tonsillectomy.  RCRI 0 points class I risk. Good functional status Pt is low risk for low risk procedure. No interventions needed from medicine at this point of time.

## 2024-05-08 ENCOUNTER — RESULT REVIEW (OUTPATIENT)
Age: 29
End: 2024-05-08

## 2024-05-08 ENCOUNTER — APPOINTMENT (OUTPATIENT)
Age: 29
End: 2024-05-08
Payer: COMMERCIAL

## 2024-05-08 PROCEDURE — 42826 REMOVAL OF TONSILS: CPT

## 2024-07-02 ENCOUNTER — APPOINTMENT (OUTPATIENT)
Dept: OTOLARYNGOLOGY | Facility: CLINIC | Age: 29
End: 2024-07-02

## 2024-07-02 VITALS
HEART RATE: 101 BPM | HEIGHT: 70 IN | DIASTOLIC BLOOD PRESSURE: 78 MMHG | OXYGEN SATURATION: 98 % | SYSTOLIC BLOOD PRESSURE: 113 MMHG | BODY MASS INDEX: 22.62 KG/M2 | TEMPERATURE: 99.4 F | WEIGHT: 158 LBS

## 2024-07-02 DIAGNOSIS — J36 PERITONSILLAR ABSCESS: ICD-10-CM

## 2024-07-02 DIAGNOSIS — J06.9 ACUTE UPPER RESPIRATORY INFECTION, UNSPECIFIED: ICD-10-CM

## 2024-07-02 DIAGNOSIS — J03.91 ACUTE RECURRENT TONSILLITIS, UNSPECIFIED: ICD-10-CM

## 2024-07-02 DIAGNOSIS — J35.8 OTHER CHRONIC DISEASES OF TONSILS AND ADENOIDS: ICD-10-CM

## 2024-07-02 PROCEDURE — 99212 OFFICE O/P EST SF 10 MIN: CPT | Mod: 24,25

## 2024-07-02 PROCEDURE — 31575 DIAGNOSTIC LARYNGOSCOPY: CPT | Mod: 79

## 2024-07-04 ENCOUNTER — NON-APPOINTMENT (OUTPATIENT)
Age: 29
End: 2024-07-04
